# Patient Record
(demographics unavailable — no encounter records)

---

## 2025-05-14 NOTE — PHYSICAL EXAM
[Alert] : alert [Crying] : crying [Normocephalic] : normocephalic [Flat Open Anterior [place]] : flat open anterior fontanelle [Conjunctivae with no discharge] : conjunctivae with no discharge [EOMI Bilateral] : EOMI bilateral [Red Reflex Bilateral] : red reflex bilateral [Normally Placed Ears] : normally placed ears [Auricles Well Formed] : auricles well formed [Palate Intact] : palate intact [Supple, full passive range of motion] : supple, full passive range of motion [Symmetric Chest Rise] : symmetric chest rise [Clear to Auscultation Bilaterally] : clear to auscultation bilaterally [Regular Rate and Rhythm] : regular rate and rhythm [S1, S2 present] : S1, S2 present [Soft] : soft [Bowel Sounds] : bowel sounds present [Umbilical Stump Dry, Clean, Intact] : umbilical stump dry, clean, intact [Normal external genitailia] : normal external genitalia [Testicles Descended Bilaterally] : testicles descended bilaterally [Patent] : patent [No Abnormal Lymph Nodes Palpated] : no abnormal lymph nodes palpated [Symmetric Flexed Extremities] : symmetric flexed extremities [Suck Reflex] : suck reflex present [Palmar Grasp] : palmar grasp present [Plantar Grasp] : plantar reflex present [Symmetric [place]] : symmetric [place] [Upgoing Babinski Sign] : upgoing Babinski sign [Dermal Melanocytosis] : Dermal Melanocytosis [Acute Distress] : no acute distress [Icteric sclera] : nonicteric sclera [Discharge] : no discharge [Erythematous Oropharynx] : no erythematous oropharynx [Palpable Masses] : no palpable masses [Murmurs] : no murmurs [Tender] : nontender [Distended] : not distended [Circumcised] : not circumcised [Clavicular Crepitus] : no clavicular crepitus [Quiles-Ortolani] : negative Quiles-Ortolani [Spinal Dimple] : no spinal dimple [Tuft of Hair] : no tuft of hair [Jaundice] : not jaundice [Acrocyanosis] : no acrocyanosis

## 2025-05-14 NOTE — HISTORY OF PRESENT ILLNESS
[Born at ___ Wks Gestation] : The patient was born at [unfilled] weeks gestation [] : via normal spontaneous vaginal delivery [[facility]] : at [facility] [(1) _____] : [unfilled] [(5) _____] : [unfilled] [BW: _____] : weight of [unfilled] [DW: _____] : Discharge weight was [unfilled] [Age: ___] : [unfilled] year old mother [G: ___] : G [unfilled] [P: ___] : P [unfilled] [HepBsAG] : HepBsAg positive [Rubella (Immune)] : Rubella immune [TsB: _____] : Total Serum Bilirubin [unfilled] mg/dL [Phototherapy Threshold: _____] : Phototherapy level per Bilitool [unfilled] (mg/dL) [Yes] : Yes [Other: ____] : [unfilled] [Breast milk] : breast milk [Hours between feeds ___] : Child is fed every [unfilled] hours [___ Feeding per 24 hrs] : a  total of [unfilled] feedings in 24 hours [___ voids per day] : [unfilled] voids per day [Frequency of stools: ___] : Frequency of stools: [unfilled]  stools [Yellow] : yellow [Seedy] : seedy [In Bassinet/Crib] : sleeps in bassinet/crib [On back] : sleeps on back [No] : No cigarette smoke exposure [Rear facing car seat in back seat] : Rear facing car seat in back seat [Smoke Detectors] : Smoke detectors at home. [Hepatitis B Vaccine Given] : Hepatitis B vaccine given [NO] : No [None] : There were no delivery complications [Significant Hx: ____] : The mother's  medical history is significant for [unfilled] [RSV vaccine] : RSV vaccine not received by mother at least 14 days prior to delivery [HIV] : HIV negative [GBS] : GBS negative [VDRL/RPR (Reactive)] : VDRL/RPR nonreactive [] : Circumcision: No [FreeTextEntry9] : O [de-identified] : 36 [Co-sleeping] : no co-sleeping [Loose bedding, pillow, toys, and/or bumpers in crib] : no loose bedding, pillow, toys, and/or bumpers in crib [Pacifier] : Not using pacifier [Exposure to electronic nicotine delivery system] : No exposure to electronic nicotine delivery system [Carbon Monoxide Detectors] : No carbon monoxide detectors at home [Nirsevimab Given] : Nirsevimab not given [FreeTextEntry7] : Cluster feeding at home, breastfeeding well, no fevers,  [de-identified] : Exclusive breastfeeding, no pumping yet. He will cluster feed for a 1-2 hour period then sleep for 5 hours. [FreeTextEntry8] : soft stools [de-identified] : swaddled

## 2025-05-14 NOTE — DISCUSSION/SUMMARY
[Normal Growth] : growth [Normal Development] : developmental [No Elimination Concerns] : elimination [Continue Regimen] : feeding [No Skin Concerns] : skin [Normal Sleep Pattern] : sleep [Term Infant] : term infant [None] : no known medical problems [Add Food/Vitamin] : add ~M [Vitamin D] : vitamin D [ Transition] :  transition [ Care] :  care [Nutritional Adequacy] : nutritional adequacy [Parental Well-Being] : parental well-being [Safety] : safety [No Vaccines] : no vaccines needed [Mother] : mother [Father] : father [FreeTextEntry1] : Keshav is a 5d olf ex 39.4 week male infant born via . No complications at birth or during hospital stay. Maternal hx of depression and she had one seizure during pregnancy for which she was placed on keppra (and she is still taking). He passed his CCHD and hearing screens in the hospital, and bilirubin levels were low. G6PD was wnl. His weight today was 2870, down 6.2% from BW. He is exclusively BF, mom states that he latches well and her milk is coming in but he cluster feeds for a few hours at a time. He is making normal wet diapers. No concerns on exam. Advised parents to return in one week for a weight check. Prescribed vitamin D drops. Gave anticipatory guidance regarding sleeping, feeding, and fevers.  SDOH domains were screened and scored.  Recommend exclusive breastfeeding, 8-12 feedings per day. Mother should continue prenatal vitamins and avoid alcohol. If formula is needed, recommend iron-fortified formulations every 2-3 hrs. When in car, patient should be in rear-facing car seat in back seat. Air dry umbilical stump. Put baby to sleep on back, in own crib with no loose or soft bedding. Limit baby's exposure to others, especially those with fever or unknown vaccine status.  RTC in 1 week for weight check

## 2025-05-14 NOTE — REVIEW OF SYSTEMS
[Crying] : crying [Dry Skin] : dry skin [Irritable] : no irritability [Inconsolable] : consolable [Eye Redness] : no eye redness [Nasal Congestion] : no nasal congestion [Snoring] : no snoring [Intolerance to feeds] : tolerance to feeds [Spitting Up] : no spitting up [Restriction of Motion] : no restriction of motion [Jaundice] : no jaundice [Rash] : no rash [Easy Bruising] : no tendency for easy bruising [FreeTextEntry1] : No fevers

## 2025-05-23 NOTE — END OF VISIT
[] : Resident [FreeTextEntry3] : 14 day old weight check breastfeeding has not regained birth weight. RTC Tuesday weight check. If no weight gain, consider metabolic w/up. EPS 2/30.

## 2025-05-23 NOTE — PHYSICAL EXAM
[Patent] : patent [NL] : warm, clear [Circumcised] : uncircumcised [FreeTextEntry3] : normally placed ears, auricles well formed

## 2025-05-23 NOTE — HISTORY OF PRESENT ILLNESS
[FreeTextEntry6] : Feeding: just started feeding with nipple shield x2 days, feeding about every 1.5/2 hours. Mom feels breasts are softer when he feeds, sees/hears him swallowing. No sweating, color changes, or trouble breathing when feeding.  Wet diapers: 7+ Stools: no stool x2 days, stool is brown, pasty, no blood  Safe Sleep: Sleeps in bassinet, no co-sleep, no loose pillows/blankets, toys  Has vitamin D drops and rectal thermometer

## 2025-05-23 NOTE — DISCUSSION/SUMMARY
[FreeTextEntry1] : Keshav is a 14d old ex 39 wk M here for weight check. Birth weight 3060g. On 5/21 was seen for weight check and was 2690g, down 12% from BW. Today is 2690g, so no weight gain or weight loss. Mom feels much more comfortable breastfeeding now with nipple shield - latch is better, feeding more, and making more wet diapers. Discussed with mom that would have her come back 5/27 for another weight check, and if baby is still not gaining adequate amount of weight would then consider workup for underlying pathology such as congenital hypothyroidism, inborn errors of metabolism, malabsorption syndromes, or congenital heart disease. Though at this time Keshav's poor weight gain is most likely due to initial inadequate caloric intake 2/2 feeding difficulties. Otherwise is doing well. Offered lactation resources in clinic today but mom declined.   #Poor weight gain  # infant - RTC 5/27 for weight check - If rectal temperature >100.4F, go to ED immediately - Recommend exclusive breastfeeding, 8-12 feedings per day. Mother should continue prenatal vitamins and avoid alcohol. If formula is needed, recommend iron-fortified formulations every 2-3 hrs. When in car, patient should be in rear-facing car seat in back seat. Put baby to sleep on back, in own crib with no loose or soft bedding. Limit baby's exposure to others, especially those with fever or unknown vaccine status.

## 2025-05-26 NOTE — HISTORY OF PRESENT ILLNESS
[de-identified] : Weight Check  [FreeTextEntry6] : Delivery: The patient was born at 39.4 weeks gestation, via normal spontaneous vaginal delivery at [facility]. APGAR scores at 1 minute and 5 minutes were 9 and 9 respectively. There were no delivery complications.   Maternal History: 29-year-old mother, G 1, P 0. The mother's medical history is significant for depression. RSV vaccine not received by mother at least 14 days prior to delivery. Prenatal labs include HepBsAg positive, HIV negative, GBS negative, Rubella immune and VDRL/RPR nonreactive. Risk factors include mom had a seizure during pregnancy.  Birth Weight: 3060g () Discharge Weight: 2930g () First Visit Weight: 2870g () Today's Visit Weight: 2690g ()   Patient lost 180g since last visit, down 12% from BW Exclusively breastfeeding every 2- 3 hour, 30-40 minutes on the breast No spit ups  MOC having some nipple pain and feels baby is refusing breast, more often since last night Normally makes 6-8 wet diapers a day- today only 4 Last stool: yesterday, no diarrhea  Endorses clear rhinorrhea, no fevers

## 2025-05-26 NOTE — DISCUSSION/SUMMARY
[FreeTextEntry1] :  Keshav is a 12d old ex 39.4-week male infant born via . No complications at birth or during hospital stay. Patient is here for weight check.   His is weight today was 2690g, down 12% from BW. Exclusively breastfeeding, 30-40 minutes every 2-3 hours. He is making decreased wet diapers (4 instead of 6-8) over the last day. No concerns on exam. MOC with sore nipples and difficulties with latch. Seen by lactation today. MOC given nipple shield with improvement in latch and feeding. Plan to have family return in 2 days for follow up weight check.   #Health Maintenace - Continue breast feeding on demand, feeding at least every 2-3 hours, utilize nipple shield as needed - Encouraged pumping to increased BM supply  - Monitor UOP, strict return precautions discussed with family regarding dehydration and fever in this age group - Vaccinations/other: Hep B given - Seen by lactation -- nipple shield helpful   - Recommend exclusive breastfeeding, 8-12 feedings per day. Mother should continue prenatal vitamins and avoid alcohol. If formula is needed, recommend iron-fortified formulations every 2-3 hrs. When in car, patient should be in rear-facing car seat in back seat. Air dry umbilical stump. Put baby to sleep on back, in own crib with no loose or soft bedding. Limit baby's exposure to others, especially those with fever or unknown vaccine status. - RTC in 2 days for repeat weight check

## 2025-05-26 NOTE — PHYSICAL EXAM
[NL] : warm, clear [Ben: ____] : Ben [unfilled] [Normal External Genitalia] : normal external genitalia [Circumcised] : uncircumcised

## 2025-05-26 NOTE — HISTORY OF PRESENT ILLNESS
[de-identified] : Weight Check  [FreeTextEntry6] : Delivery: The patient was born at 39.4 weeks gestation, via normal spontaneous vaginal delivery at [facility]. APGAR scores at 1 minute and 5 minutes were 9 and 9 respectively. There were no delivery complications.   Maternal History: 29-year-old mother, G 1, P 0. The mother's medical history is significant for depression. RSV vaccine not received by mother at least 14 days prior to delivery. Prenatal labs include HepBsAg positive, HIV negative, GBS negative, Rubella immune and VDRL/RPR nonreactive. Risk factors include mom had a seizure during pregnancy.  Birth Weight: 3060g () Discharge Weight: 2930g () First Visit Weight: 2870g () Today's Visit Weight: 2690g ()   Patient lost 180g since last visit, down 12% from BW Exclusively breastfeeding every 2- 3 hour, 30-40 minutes on the breast No spit ups  MOC having some nipple pain and feels baby is refusing breast, more often since last night Normally makes 6-8 wet diapers a day- today only 4 Last stool: yesterday, no diarrhea  Endorses clear rhinorrhea, no fevers

## 2025-05-28 NOTE — HISTORY OF PRESENT ILLNESS
[FreeTextEntry6] : Here with Mom and Dad for weight check No weight gain for last 3 visits Feeding more regularly with nipple shield Every 2-3 hours Sometimes q1 hour  Falls asleep frequently still Most feeds >30 minutes Pooping infrequently, last one was [place], brown/yellow Had gone 7 days without stooling, now about every 2 days  Last night crying was extreme, FOC attempted to call office, ended up speaking with ER doctor  FOC worried about blockage or impaction, wondering if US is needed  Just started pumping, in 30 minutes getting 1-2 oz per breast  Can chug 4 oz in a feed Giving bottle 2-3 total since last visit UOP >4 daily   Infant sleeps in bassinet/crib, on back, firm mattress, and without additional loose items. No co-sleeping. Car seat is rear facing in back of the car

## 2025-05-28 NOTE — DISCUSSION/SUMMARY
[FreeTextEntry1] : Ex 39.4 male here for weight check at 18 DOL. No weight gain during last three visits. Reassuring weight is at least stable and infant is not losing weight. Otherwise without new symptoms ie vomiting, runny nose, fever, rashes and with normal exam today. Reviewed that given infant is passing gas, has soft abdominal exam today, and without vomiting, unlikely blockage or impaction as cause. Most likely secondary to inadequate breastmilk / prolonged feeds. Reviewed various feeding plans. Given parental concern, in shared decision making, will trial supplementation with either expressed breast milk or formula after each breast feed. Reviewed goal is to provide energy so infant can continue to learn and train how to breast feed. Recommended putting infant on one breast per feed, max 30-40 minutes, and only allow infant to fall asleep once. If they fall asleep a second time, end the feed and offer a bottle, allow infant to take as much as they want. Continue to feed every 2-3 hours and follow up for weight check on 5/30. If no weight gain with supplementation, then raises concern for occult organic disease and will obtain labs at that time. Reassuring today is again normal, vigorous exam, normal UOP, normal belly button / tongue (makes hypothyroidism less likely), and lack of weight loss.   Both parents in agreement of plan. Call office for new symptoms or concerns. Provided number for office and reviewed after hours phone triage. Reviewed if infant is fussy for more than 30 minutes, undress and ensure fingers/toes/penis/testicles are now swollen (if they are call 911), and if infant is otherwise feeding, peeing once every 8 hours, and not breathing fast, then this can be normal and continue to closely monitor.    During today's visit, services included coordinating care, counseling and education patient/family/caregiver, documenting clinical information in the electronic health record, reviewed medical records and data, total time spent on the E/M service before/during/after visit on date of service: 30 minutes

## 2025-05-30 NOTE — HISTORY OF PRESENT ILLNESS
[FreeTextEntry6] : Here with Mom and Dad for weight check At three prior visits, no weight gain Now gaining 90 g/day (likely over estimated, likely large stool coming) BF and supplementing with EBM/formula about 50% of feeds, takes 2 oz from bottle Otherwise bottle feeding, takes 2-4 oz When pumping, gets 2-4 oz out BF alone, tried x1-2 per day, takes 45 minutes both breasts Infant sleeps in bassinet/crib, on back, firm mattress, and without additional loose items. No co-sleeping. Car seat is rear facing in back of the car

## 2025-05-30 NOTE — DISCUSSION/SUMMARY
[FreeTextEntry1] : Great weight gain, MOC happy with supplementation plan, normal exam, reviewed age appropriate feeds, continue to supplement, allow infant to take as much from bottle as they want, follow up after 6/20 for early 2 month visit (at 6 weeks of age) to help save the family some visits, come in sooner for trouble feeding or if you increase breast feeding without supplementation. Umbilical stump healed and dry, reviewed tummy time and bathes.  - Vital signs reviewed and normal - Routine age appropriate bright futures topics discussed, including but not limited to the topics below - Reviewed feeding, continue every 2 to 4 hours. Do not need to wake baby to feed once regained birth weight - Stools should be light yellow. Call clinic if stools have blood, are black, or white. Baby needs to pee at least three times daily - Give Vitamin D 400 units daily if breast feeding - Do not submerge infant in bath until umbilical stump has fallen off / healed and is dry - Start supervised tummy time for 20 minutes daily, does not have to be all at once - Reviewed safe sleep and sudden infant death syndrome risk. Place baby on back to sleep in own crib without extra bedding - Use rear facing car seat in the back of the car when travelling in a motor vehicle as this is the safest position for the baby - Monitor for signs of fever, check rectal temperature if baby is hard to wake up, having persistent trouble feeding, or warm. If >100.4F or less than 97F, must go to an emergency department for evaluation - Safety measures: never leave baby alone in tub/high place/alone, and do not shake baby - Call office for any concerning symptoms or parental concerns

## 2025-06-25 NOTE — HISTORY OF PRESENT ILLNESS
[DTaP/IPV/Hib/HepB] : DTaP/IPV/Hib/HepB [Rotavirus] : Rotavirus [Other: ____] : [unfilled] [FreeTextEntry1] :  Administered 0.5ml Dtap/Hib/IPV/Hep B vaccine Vaxelis to Right thigh IM. Administered 0.5ml PCV vaccine to Left Thigh IM. Administered 2ml Rota vaccine orally. Patient tolerated vaccines well. Parents provided with VIS statements. Parents encouraged to call if questions or concerns arise.